# Patient Record
Sex: FEMALE
[De-identification: names, ages, dates, MRNs, and addresses within clinical notes are randomized per-mention and may not be internally consistent; named-entity substitution may affect disease eponyms.]

---

## 2018-10-30 ENCOUNTER — HOSPITAL ENCOUNTER (EMERGENCY)
Dept: HOSPITAL 14 - H.ER | Age: 33
Discharge: HOME | End: 2018-10-30
Payer: SELF-PAY

## 2018-10-30 VITALS
DIASTOLIC BLOOD PRESSURE: 87 MMHG | TEMPERATURE: 98.2 F | SYSTOLIC BLOOD PRESSURE: 134 MMHG | HEART RATE: 72 BPM | OXYGEN SATURATION: 100 % | RESPIRATION RATE: 18 BRPM

## 2018-10-30 DIAGNOSIS — K04.7: Primary | ICD-10-CM

## 2018-10-30 PROCEDURE — 99282 EMERGENCY DEPT VISIT SF MDM: CPT

## 2018-10-30 PROCEDURE — 96372 THER/PROPH/DIAG INJ SC/IM: CPT

## 2018-10-30 NOTE — ED PDOC
HPI: Dental Pain/Injury


Time Seen by Provider: 10/30/18 18:45


Chief Complaint (Nursing): Dental Pain


Chief Complaint (Provider): Dental Pain


History Per: Patient


Onset/Duration Of Symptoms: Days (x1 day ago)


Additional Complaint(s): 





Patient reports right upper tooth pain that began yesterday. She has no recent 

dental work or tooth injury and states that she has pain with chewing and that 

it radiates to her ear. Patient has taken 2 tabs of Advil at noon but had no 

relief.   Otherwise:  (-) fever, (-) chills, (-) ear pain, (-) throat pain, (-) 

cough, (-) shortness of breath, (-) facial swelling,





PMD: Emre Hernandez 


LMP: 1.5 weeks ago





Past Medical History


Reviewed: Historical Data, Nursing Documentation, Vital Signs


Vital Signs: 


                                Last Vital Signs











Temp  98.2 F   10/30/18 18:42


 


Pulse  72   10/30/18 18:42


 


Resp  18   10/30/18 18:42


 


BP  134/87   10/30/18 18:42


 


Pulse Ox  100   10/30/18 18:42














- Medical History


PMH: No Chronic Diseases





- Surgical History


Surgical History: No Surg Hx





- Family History


Family History: States: Unknown Family Hx





- Home Medications


Home Medications: 


                                Ambulatory Orders











 Medication  Instructions  Recorded


 


Acetaminophen [Acetaminophen 8 650 mg PO Q8 PRN #21 tablet.er 10/30/18





Hour]  


 


Amoxicillin/Clavulanate [Augmentin 1 tab PO BID #14 tab 10/30/18





875 MG-125 MG]  


 


RX: Ibuprofen [Motrin Tab] 800 mg PO Q8 PRN #21 tab 10/30/18














- Allergies


Allergies/Adverse Reactions: 


                                    Allergies











Allergy/AdvReac Type Severity Reaction Status Date / Time


 


No Known Allergies Allergy   Verified 10/30/18 18:42














Review of Systems


ROS Statement: Except As Marked, All Systems Reviewed And Found Negative


Constitutional: Negative for: Fever, Chills


ENT: Positive for: Other (tooth pain).  Negative for: Throat Pain


Cardiovascular: Negative for: Chest Pain


Respiratory: Negative for: Cough, Shortness of Breath





Physical Exam





- Reviewed


Nursing Documentation Reviewed: Yes


Vital Signs Reviewed: Yes





- Physical Exam


Comments: 


GENERAL APPEARANCE: Patient is awake, alert, oriented x 3, in no acute distress.

Resting comfortably. 


SKIN:  Warm, dry; (-) cyanosis.


ENMT: (-) sinus swelling or tenderness. (+) Uvula Midline; Pharynx: (+) Clear (-

) tongue elevation, (-) exudate, (-) erythema. Airway patent:  (-) stridor. (-) 

Submandibular or submental neck swelling (-) pseudomembranes  Mouth: (+) right 

upper 1st molar tenderness to percussion, (+) plaque build up with surrounding 

erythema; (-) gingival inflammation. Remainder of dentition intact (-) 

tenderness.


NECK: Supple, FROM (-) tenderness, (-) crepitus.


LUNGS: clear to auscultation bilaterally (-) rales (-) wheezing (-) rhonchi


CARDIAC: (-) irregularity





- Laboratory Results


Urine Pregnancy POC: Negative





- ECG


O2 Sat by Pulse Oximetry: 100 (RA)


Pulse Ox Interpretation: Normal





Medical Decision Making


Medical Decision Making: 


Initial Time: 19:05


Initial Impression: Acute dental pain, probable infection


Initial Plan: 


--Pregnancy test


--tylenol 650 mg PO


--toradol 30 mg IM


--augmentin 875 mg - 125 mg 1 tab





Upreg: negative





1955


On re-evaluation, patient reports improvement of symptoms. On exam, patient 

remains AAOx3, in no acute distress. Lungs clear to auscultation, cardiac RRR,  

repeat neuro exam shows no focal findings. Vitals stable. 


Lab / Diagnostic results d/w the patient in great detail. Diagnosis of 

toothache, probable dental infection d/w the patient. 


Based on history, exam and diagnostic results, plan will be for outpatient 

follow up with dental as soon as possible. 


Patient instructed to follow-up with pmd / referral provided / the clinic  in 1-

2 days without fail. Advised to take medication as prescribed. Return to the 

emergency room at any time for any new or worsening symptoms. Patient states she

fully agrees with and understands discharge instructions. States that she agrees

with the plan and disposition. Verbalized and repeated discharge instructions 

and plan. I have given the patient opportunity to ask any additional questions.


 

--------------------------------------------------------------------------------


-----------------   


Scribe Attestation:


Documented by Efren Erickson, acting as a scribe for Nisha SAMUELS





Provider Scribe Attestation:


All medical record entries made by the Scribe were at my direction and 

personally dictated by me. I have reviewed the chart and agree that the record 

accurately reflects my personal performance of the history, physical exam, 

medical decision making, and the department course for this patient. I have also

personally directed, reviewed, and agree with the discharge instructions and 

disposition.





Disposition





- Clinical Impression


Clinical Impression: 


 Pain, dental, Dental infection








- Patient ED Disposition


Is Patient to be Admitted: No


Counseled Patient/Family Regarding: Studies Performed, Diagnosis, Need For 

Followup, Rx Given





- Disposition


Referrals: 


your, dentist [Other]


David COOK,Emre SUAREZ [Non-Staff] - 


Disposition: Routine/Home


Disposition Time: 19:55


Condition: STABLE


Additional Instructions: 


FOLLOW UP WITH DENTIST IN 1-2 DAYS FOR FURTHER EVALUATION.


TAKE ANTIBIOTICS AS PRESCRIBED UNTIL COMPLETE.





The emergency medical care you received today was directed at your acute 

symptoms. If you were prescribed any medication, please fill it and take as 

directed. It may take several days for your symptoms to resolve. Return to the 

Emergency Department if your symptoms worsen, do not improve, or if you have any

other problems.





Please contact your doctor in 2 days for re-evaluation and follow up / or call 

one of the physicians/clinics you have been referred to that are listed on the 

Patient Visit Information form that is included in your discharge packet. Bring 

any paperwork you were given at discharge with you along with any medications 

you are taking to your follow up visit. Our treatment cannot replace ongoing 

medical care by a primary care provider (PCP) outside of the emergency 

department.


Prescriptions: 


Acetaminophen [Acetaminophen 8 Hour] 650 mg PO Q8 PRN #21 tablet.er


 PRN Reason: Pain, Moderate (4-7)


Amoxicillin/Clavulanate [Augmentin 875 MG-125 MG] 1 tab PO BID #14 tab


RX: Ibuprofen [Motrin Tab] 800 mg PO Q8 PRN #21 tab


 PRN Reason: Pain, Moderate (4-7)


Instructions:  Tooth Decay, Adult, Tooth Abscess (DC), Dental Pain (DC), How to 

Care for Your Mouth and Teeth


Forms:  [facility] (English)


Print Language: ENGLISH





- POA


Present On Arrival: None